# Patient Record
Sex: FEMALE | Employment: FULL TIME | URBAN - METROPOLITAN AREA
[De-identification: names, ages, dates, MRNs, and addresses within clinical notes are randomized per-mention and may not be internally consistent; named-entity substitution may affect disease eponyms.]

---

## 2018-08-31 ENCOUNTER — OFFICE VISIT (OUTPATIENT)
Dept: URGENT CARE | Age: 28
End: 2018-08-31

## 2018-08-31 VITALS
SYSTOLIC BLOOD PRESSURE: 120 MMHG | BODY MASS INDEX: 26.84 KG/M2 | OXYGEN SATURATION: 97 % | TEMPERATURE: 98.7 F | HEIGHT: 66 IN | HEART RATE: 77 BPM | DIASTOLIC BLOOD PRESSURE: 74 MMHG | WEIGHT: 167 LBS | RESPIRATION RATE: 18 BRPM

## 2018-08-31 DIAGNOSIS — J06.9 UPPER RESPIRATORY TRACT INFECTION, UNSPECIFIED TYPE: Primary | ICD-10-CM

## 2018-08-31 DIAGNOSIS — J02.9 SORE THROAT: ICD-10-CM

## 2018-08-31 LAB
S PYO AG THROAT QL: NEGATIVE
VALID INTERNAL CONTROL?: YES

## 2018-08-31 RX ORDER — BENZONATATE 200 MG/1
200 CAPSULE ORAL
Qty: 30 CAP | Refills: 0 | Status: SHIPPED | OUTPATIENT
Start: 2018-08-31

## 2018-08-31 NOTE — MR AVS SNAPSHOT
[address] [address] [facility] [address] 
[phone] Patient: Sydney Levy MRN: [id number] :1990 Visit Information Date & Time Provider Department Dept. Phone Encounter #  
 2018 10:15 AM [facility] [phone] [id number] Upcoming Health Maintenance Date Due DTaP/Tdap/Td series (1 - Tdap) 2012 PAP AKA CERVICAL CYTOLOGY 2012 Influenza Age 5 to Adult 2018 Allergies as of 2018  Review Complete On: 2018 By: Chidi Meeks MD  
  
 Severity Noted Reaction Type Reactions Bactrim [Sulfamethoprim]  2018    Rash Current Immunizations  Never Reviewed No immunizations on file. Not reviewed this visit You Were Diagnosed With   
  
 Codes Comments Upper respiratory tract infection, unspecified type    -  Primary ICD-10-CM: J06.9 ICD-9-CM: 465.9 Sore throat     ICD-10-CM: J02.9 ICD-9-CM: 411 Vitals BP Pulse Temp Resp Height(growth percentile) Weight(growth percentile) 120/74 77 98.7 °F (37.1 °C) 18 5' 6\" (1.676 m) 167 lb (75.8 kg) LMP SpO2 BMI OB Status Smoking Status 2018 97% 26.95 kg/m2 Having regular periods Never Smoker BMI and BSA Data Body Mass Index Body Surface Area  
 26.95 kg/m 2 1.88 m 2 Preferred Pharmacy Pharmacy Name Phone [id number] [address] [id number] [phone] Your Updated Medication List  
  
   
This list is accurate as of 18 10:44 AM.  Always use your most recent med list.  
  
  
  
  
 benzonatate 200 mg capsule Commonly known as:  TESSALON Take 1 Cap by mouth three (3) times daily as needed for Cough. Prescriptions Sent to Pharmacy Refills  
 benzonatate (TESSALON) 200 mg capsule 0 Sig: Take 1 Cap by mouth three (3) times daily as needed for Cough.   
 Class: Normal  
 Pharmacy: [address] [place] [address] [address] #: [phone] Route: Oral  
  
We Performed the Following AMB POC RAPID STREP A [31418 CPT(R)] CULTURE, STREP THROAT [id number] CPT(R)] Patient Instructions Upper Respiratory Infection (Cold): Care Instructions Your Care Instructions An upper respiratory infection, or URI, is an infection of the nose, sinuses, or throat. URIs are spread by coughs, sneezes, and direct contact. The common cold is the most frequent kind of URI. The flu and sinus infections are other kinds of URIs. Almost all URIs are caused by viruses. Antibiotics won't cure them. But you can treat most infections with home care. This may include drinking lots of fluids and taking over-the-counter pain medicine. You will probably feel better in 4 to 10 days. The doctor has checked you carefully, but problems can develop later. If you notice any problems or new symptoms, get medical treatment right away. Follow-up care is a key part of your treatment and safety. Be sure to make and go to all appointments, and call your doctor if you are having problems. It's also a good idea to know your test results and keep a list of the medicines you take. How can you care for yourself at home? · To prevent dehydration, drink plenty of fluids, enough so that your urine is light yellow or clear like water. Choose water and other caffeine-free clear liquids until you feel better. If you have kidney, heart, or liver disease and have to limit fluids, talk with your doctor before you increase the amount of fluids you drink. · Take an over-the-counter pain medicine, such as acetaminophen (Tylenol), ibuprofen (Advil, Motrin), or naproxen (Aleve). Read and follow all instructions on the label. · Before you use cough and cold medicines, check the label. These medicines may not be safe for young children or for people with certain health problems. · Be careful when taking over-the-counter cold or flu medicines and Tylenol at the same time. Many of these medicines have acetaminophen, which is Tylenol. Read the labels to make sure that you are not taking more than the recommended dose. Too much acetaminophen (Tylenol) can be harmful. · Get plenty of rest. 
· Do not smoke or allow others to smoke around you. If you need help quitting, talk to your doctor about stop-smoking programs and medicines. These can increase your chances of quitting for good. When should you call for help? Call 911 anytime you think you may need emergency care. For example, call if: 
  · You have severe trouble breathing.  
 Call your doctor now or seek immediate medical care if: 
  · You seem to be getting much sicker.  
  · You have new or worse trouble breathing.  
  · You have a new or higher fever.  
  · You have a new rash.  
 Watch closely for changes in your health, and be sure to contact your doctor if: 
  · You have a new symptom, such as a sore throat, an earache, or sinus pain.  
  · You cough more deeply or more often, especially if you notice more mucus or a change in the color of your mucus.  
  · You do not get better as expected. Where can you learn more? Go to http://millicent-adele.info/. Enter F345 in the search box to learn more about \"Upper Respiratory Infection (Cold): Care Instructions. \" Current as of: December 6, 2017 Content Version: 11.7 © 5521-1952 [employer]. Care instructions adapted under license by [employer] (which disclaims liability or warranty for this information). If you have questions about a medical condition or this instruction, always ask your healthcare professional. [address] any warranty or liability for your use of this information. Introducing [facility]!    
 Lolita Piper introduces [employer] patient portal. Now you can access parts of your medical record, email your doctor's office, and request medication refills online. 1. In your internet browser, go to [email or web address] 2. Click on the First Time User? Click Here link in the Sign In box. You will see the New Member Sign Up page. 3. Enter your [employer] Access Code exactly as it appears below. You will not need to use this code after youve completed the sign-up process. If you do not sign up before the expiration date, you must request a new code. · [employer] Access Code: [email or web address] Expires: 11/29/2018 10:44 AM 
 
4. Enter the last four digits of your Social Security Number (xxxx) and Date of Birth (mm/dd/yyyy) as indicated and click Submit. You will be taken to the next sign-up page. 5. Create a [employer] ID. This will be your [employer] login ID and cannot be changed, so think of one that is secure and easy to remember. 6. Create a [employer] password. You can change your password at any time. 7. Enter your Password Reset Question and Answer. This can be used at a later time if you forget your password. 8. Enter your e-mail address. You will receive e-mail notification when new information is available in [address] 9. Click Sign Up. You can now view and download portions of your medical record. 10. Click the Download Summary menu link to download a portable copy of your medical information. If you have questions, please visit the Frequently Asked Questions section of the [employer] website. Remember, [employer] is NOT to be used for urgent needs. For medical emergencies, dial 911. Now available from your iPhone and Android! Please provide this summary of care documentation to your next provider. Your primary care clinician is listed as Britni Shelton. If you have any questions after today's visit, please call [phone].

## 2018-08-31 NOTE — PROGRESS NOTES
Patient is a 33 y.o. female presenting with cold symptoms. Cold Symptoms   The history is provided by the patient. This is a new problem. Episode onset: 3 days ago. The problem has been gradually worsening. The cough is productive of sputum. There has been no fever. Associated symptoms include rhinorrhea and sore throat. Pertinent negatives include no chest pain, no chills, no sweats, no ear congestion, no ear pain, no headaches, no myalgias, no shortness of breath, no wheezing, no nausea and no vomiting. Treatments tried: Dayquil/Nyquil. She is not a smoker. Her past medical history does not include asthma. History reviewed. No pertinent past medical history. History reviewed. No pertinent surgical history. History reviewed. No pertinent family history. Social History     Social History    Marital status: SINGLE     Spouse name: N/A    Number of children: N/A    Years of education: N/A     Occupational History    Not on file. Social History Main Topics    Smoking status: Never Smoker    Smokeless tobacco: Never Used    Alcohol use No    Drug use: No    Sexual activity: Not on file     Other Topics Concern    Not on file     Social History Narrative                ALLERGIES: Bactrim [sulfamethoprim]    Review of Systems   Constitutional: Negative for activity change, appetite change, chills and fever. HENT: Positive for congestion, rhinorrhea and sore throat. Negative for ear pain, sinus pain, sinus pressure and trouble swallowing. Respiratory: Positive for cough. Negative for shortness of breath and wheezing. Cardiovascular: Negative for chest pain and palpitations. Gastrointestinal: Negative for nausea and vomiting. Musculoskeletal: Negative for myalgias. Neurological: Negative for dizziness and headaches. Hematological: Positive for adenopathy.        Vitals:    08/31/18 1011   BP: 120/74   Pulse: 77   Resp: 18   Temp: 98.7 °F (37.1 °C)   SpO2: 97%   Weight: 167 lb (75.8 kg)   Height: 5' 6\" (1.676 m)       Physical Exam   Constitutional: She appears well-developed and well-nourished. No distress. HENT:   Right Ear: Tympanic membrane, external ear and ear canal normal.   Left Ear: Tympanic membrane, external ear and ear canal normal.   Nose: Rhinorrhea present. Right sinus exhibits no maxillary sinus tenderness and no frontal sinus tenderness. Left sinus exhibits no maxillary sinus tenderness and no frontal sinus tenderness. Mouth/Throat: Mucous membranes are normal. Posterior oropharyngeal edema and posterior oropharyngeal erythema present. No oropharyngeal exudate or tonsillar abscesses. Bilateral ear canals: ceruminous   Cardiovascular: Normal rate, regular rhythm and normal heart sounds. Pulmonary/Chest: Effort normal and breath sounds normal. No respiratory distress. She has no wheezes. She has no rales. Lymphadenopathy:     She has cervical adenopathy. Neurological: She is alert. Skin: She is not diaphoretic. Psychiatric: She has a normal mood and affect. Her behavior is normal. Judgment and thought content normal.   Nursing note and vitals reviewed. MDM    ICD-10-CM ICD-9-CM    1. Upper respiratory tract infection, unspecified type J06.9 465.9    2. Sore throat J02.9 462 AMB POC RAPID STREP A      CULTURE, STREP THROAT     Medications Ordered Today   Medications    benzonatate (TESSALON) 200 mg capsule     Sig: Take 1 Cap by mouth three (3) times daily as needed for Cough. Dispense:  30 Cap     Refill:  0     The patients condition was discussed with the patient and they understand. The patient is to follow up with PCP [facility]. If signs and symptoms become worse the pt is to go to the ER. The patient is to take medications as prescribed.      Results for orders placed or performed in visit on 08/31/18   AMB POC RAPID STREP A   Result Value Ref Range    VALID INTERNAL CONTROL POC Yes     Group A Strep Ag Negative Negative Procedures

## 2018-08-31 NOTE — PATIENT INSTRUCTIONS

## 2018-09-02 LAB — S PYO THROAT QL CULT: ABNORMAL

## 2018-09-02 RX ORDER — AMOXICILLIN 875 MG/1
875 TABLET, FILM COATED ORAL EVERY 12 HOURS
Qty: 20 TAB | Refills: 0 | Status: SHIPPED | OUTPATIENT
Start: 2018-09-02 | End: 2018-09-12